# Patient Record
Sex: MALE | Race: OTHER | Employment: OTHER | URBAN - METROPOLITAN AREA
[De-identification: names, ages, dates, MRNs, and addresses within clinical notes are randomized per-mention and may not be internally consistent; named-entity substitution may affect disease eponyms.]

---

## 2018-03-08 ENCOUNTER — OFFICE VISIT (OUTPATIENT)
Dept: FAMILY MEDICINE CLINIC | Age: 33
End: 2018-03-08

## 2018-03-08 VITALS
DIASTOLIC BLOOD PRESSURE: 60 MMHG | TEMPERATURE: 98.1 F | WEIGHT: 121 LBS | HEIGHT: 64 IN | SYSTOLIC BLOOD PRESSURE: 97 MMHG | HEART RATE: 67 BPM | BODY MASS INDEX: 20.66 KG/M2

## 2018-03-08 DIAGNOSIS — R05.9 COUGH IN ADULT: ICD-10-CM

## 2018-03-08 DIAGNOSIS — Z23 ENCOUNTER FOR IMMUNIZATION: ICD-10-CM

## 2018-03-08 DIAGNOSIS — L30.9 ECZEMA, UNSPECIFIED TYPE: ICD-10-CM

## 2018-03-08 DIAGNOSIS — Z13.9 ENCOUNTER FOR SCREENING: Primary | ICD-10-CM

## 2018-03-08 LAB — HGB BLD-MCNC: 15 G/DL

## 2018-03-08 RX ORDER — LORATADINE 10 MG/1
10 TABLET ORAL DAILY
Qty: 30 TAB | Refills: 5 | Status: SHIPPED | OUTPATIENT
Start: 2018-03-08

## 2018-03-08 RX ORDER — GUAIFENESIN 600 MG/1
600 TABLET, EXTENDED RELEASE ORAL 2 TIMES DAILY
Qty: 20 TAB | Refills: 1 | Status: SHIPPED | OUTPATIENT
Start: 2018-03-08

## 2018-03-08 NOTE — PATIENT INSTRUCTIONS
Tos: Instrucciones de cuidado - [ Cough: Care Instructions ]  Instrucciones de cuidado    La tos es [place] de she[place] cuerpo a algo que molesta en la garganta o las vías respiratorias. La tos puede ser provocada por muchas cosas. Usted podría toser debido a un resfriado o mirna gripe, mirna bronquitis o el asma. Fumar, el goteo posnasal, las alergias y el ácido estomacal que regresa a sheffield garganta también pueden causar tos. La tos es un síntoma, no mirna enfermedad. En la [employer], la tos cesa cuando desaparece la causa, jorge un resfriado. Puede megha algunas medidas en sheffield hogar para toser menos y sentirse mejor. La atención de seguimiento es mirna parte clave de sheffield tratamiento y seguridad. Asegúrese de hacer y acudir a todas las citas, y llame a sheffield médico si está teniendo problemas. También es mirna buena idea saber los resultados de los exámenes y mantener mirna lista de los medicamentos que amy. ¿Cómo puede cuidarse en el hogar? · Aury abundante agua y otros líquidos. [place] ayuda a [place] [employer] mucosidad sea menos espesa y [place] la garganta seca o adolorida. La miel o el jugo de orquidea en [place] o té podrían aliviar mirna tos seca. · [employer] medicamentos para la tos según las indicaciones de sheffield médico.  · Eleve la ghazal sobre almohadas para ayudarle a respirar y aliviar la tos seca. · Pruebe pastillas para la tos para aliviar la garganta seca o adolorida. Las pastillas para la tos no detienen la tos. Las pastillas para la tos medicinales saborizadas no son mejores que las pastillas con sabor a shirley o los caramelos duros. · No fume. Evite el humo de tabaco ambiental. Si necesita ayuda para dejar de fumar, hable con sheffield médico sobre programas y medicamentos para dejar de fumar. Estos pueden aumentar la probabilidades de dejar el hábito para siempre. ¿Cuándo debe pedir ayuda? Llame al 911 en cualquier momento que considere que necesita atención de [place]. Por ejemplo, llame si:  ?  · Tiene graves dificultades para respirar. ? Llame a sheffield médico ahora mismo o busque atención médica inmediata si:  ? · Tose grecia. ? · Tiene nuevas dificultades para respirar o Clarence Mom. ? · Tiene fiebre nueva o más norm. ? · Tiene un salpullido nuevo. ?Preste especial atención a los cambios en sheffield shelia y asegúrese de comunicarse con sheffield médico si:  ? · [place] tos es más profunda o más frecuente que antes, especialmente si nota más mucosidad o un cambio en el color de la mucosidad. ? · Tiene nuevos síntomas, jorge dolor de garganta, dolor de oídos o dolor en los senos paranasales. ? · No mejora jorge se esperaba. ¿Dónde puede encontrar más información en inglés? Vaishali Dodge a [email or web address].info/. Escriba D279 en la búsqueda para aprender más acerca de \"Tos: Instrucciones de cuidado - [ Cough: Care Instructions ]. \"  Revisado: 12 baron, 2017  Versión del contenido: 11.4  © 9259-5829 Healthwise, Incorporated. Las instrucciones de cuidado fueron adaptadas bajo licencia por Good Help Connections (which disclaims liability or warranty for this information). Si usted tiene [place] afección médica o sobre estas instrucciones, siempre pregunte a sheffield profesional de shelia. Healthwise, Incorporated niega toda garantía o responsabilidad por sheffield uso de esta información. Dermatitis atópica: Instrucciones de cuidado - [ Atopic Dermatitis: Care Instructions ]  Instrucciones de cuidado  La dermatitis atópica (también llamada eccema) es un problema de la piel que causa mirna comezón intensa y un salpullido Vonne Borer y Anvaing. En los casos graves, el salpullido forma ampollas que contienen un líquido ashley. El salpullido no es contagioso. Las personas con esta afección parecen tener sistemas inmunitarios muy sensibles, que tienen propensión a reaccionar a cosas que causan alergias. El sistema inmunitario es la manera en que el organismo combate las infecciones.   No existe dulce para la dermatitis atópica, sandeep yosef vez pueda controlarla con cuidados en el hogar. La atención de seguimiento es mirna parte clave de sheffield tratamiento y seguridad. Asegúrese de hacer y acudir a todas las citas, y llame a sheffield médico si está teniendo problemas. También es mirna buena idea saber los resultados de los exámenes y mantener mirna lista de los medicamentos que amy. ¿Cómo puede cuidarse en el hogar? · Use un humectante al CHILDREN'S \[facility] [place] [facility]\"" [facility] veces al día. · Si sheffield médico le receta mirna crema, úsela según las indicaciones. Si sheffield médico le receta otros medicamentos, tómelos exactamente según las indicaciones. · Lávese la geovani afectada solo con agua. El jabón puede empeorar la sequedad y la comezón. Seque la geovani con toques suaves de toalla. · Aplíquese un humectante después de bañarse. Utilice cremas jorge Lubriderm, Moisturel o Cetaphil que no irritan la piel ni causan salpullido. Aplíquese la crema mientras todavía tiene la piel húmeda después de secarla ligeramente con mirna toalla. · Use paños fríos y húmedos para reducir la comezón. · Manténgase fresco y evite el sol. · Si la comezón afecta la actividades normales, un antihistamínico de venta [place], jorge difenhidramina (Benadryl) o loratadina (Claritin) podría ayudar. [place] y siga todas las instrucciones de la [place]. ¿Cuándo debe pedir ayuda? Llame a sheffield médico ahora mismo o busque atención médica inmediata si:  ? · El salpullido [place]. ? · Tiene ampollas o moretones nuevos, o el salpullido se extiende y parece [place]. ? · Tiene señales de infección, tales jorge:  ¨ Aumento del dolor, la hinchazón, el enrojecimiento o la temperatura. ¨ Vetas rojizas que salen del salpullido. ¨ Pus que sale del salpullido. Uday Glez. ? · Tiene llagas con costras o que exudan líquido. ? · Tiene alex en las articulaciones o el marie del cuerpo, además del salpullido.    ?Preste especial atención a los cambios en sheffield shelia y asegúrese de comunicarse con sheffield médico si:  ? · El salpullido no desaparece después de 2 a 3 semanas de tratamiento en el hogar. ? · La comezón interfiere en el sueño o las actividades cotidianas. ¿Dónde puede encontrar más información en inglés? Oumou Palacios a http://[email or web address]-james.[email or web address] Kyler Hickey N440 en la búsqueda para aprender más acerca de \"Dermatitis atópica: Instrucciones de cuidado - [ Atopic Dermatitis: Care Instructions ]. \"  Revisado: 13 octubre, 2016  Versión del contenido: 11.4  © 7721-3100 Healthwise, Incorporated. Las instrucciones de cuidado fueron adaptadas bajo licencia por Good Help Connections (which disclaims liability or warranty for this information). Si usted tiene [place] afección médica o sobre estas instrucciones, siempre pregunte a sheffield profesional de shelia. [place], [employer]EEspy niega toda garantía o responsabilidad por sheffield uso de esta información.

## 2018-03-08 NOTE — MR AVS SNAPSHOT
[address] [address] [address] 
[phone] Patient: Shahla Sanchez MRN: [id number] :1988 Visit Information [place] [facility] Faith Personal Médico Departamento Teléfono del Dep. Número de visita 3/8/2018  9:45 AM Quirino Trung [address], DORCAS Mcmanus [phone] Follow-up Instructions Return if symptoms worsen or fail to improve. Upcoming Health Maintenance Date Due DTaP/Tdap/Td series (1 - Tdap) 2007 Influenza Age 5 to Adult 2017 Jessica Graver A partir del:  3/8/2018 No Known Allergies Vacunas actuales Glenny Kennel No hay ninguna vacuna archivada. No revisadas esta visita You Were Diagnosed With   
  
 Sally Garibay Encounter for screening    -  Primary ICD-10-CM: Z13.9 ICD-9-CM: V82.9 Eczema, unspecified type     ICD-10-CM: L30.9 ICD-9-CM: 692.9 Cough in adult     ICD-10-CM: R05 ICD-9-CM: 786.2 Partes vitales PS Pulso Temperatura [place] ( percentil de crecimiento) Peso (percentil de crecimiento) BMI (IMC)  
 97/60 (BP 1 Location: Right arm, BP Patient Position: Sitting) 67 98.1 °F (36.7 °C) (Oral) 5' 3.78\" (1.62 m) 121 lb (54.9 kg) 20.91 kg/m2 Estatus de tabaquísmo Never Smoker Historial de signos vitales BMI and BSA Data Body Mass Index Body Surface Area  
 20.91 kg/m 2 1.57 m 2 [facility] Name Phone [place] [address], [address] [facility] [phone] [place] lista de medicamentos actualizada Lista actualizada 3/8/18 10:50 AM.  Li Cough use pelletier lista de medicamentos más reciente. guaiFENesin  mg ER tablet También conocido jorge:  Yaakov [employer] Yaakov Take 1 Tab by mouth two (2) times a day. [place] 1 [employer] veces al francisco con un vaso de [place] hydrocortisone 0.5 % lotion Use sparingly bid as needed  
  
 loratadine 10 mg tablet También conocido jorge:  Franklin Lush Take 1 Tab by mouth daily. [place] 1 pastilla cada francisco Recetas Enviado a la [place] Refills  
 loratadine (CLARITIN) 10 mg tablet 5 Sig: Take 1 Tab by mouth daily. [place] 1 pastilla cada francisco Class: Normal  
 Pharmacy: [facility] [address], [address] [facility] Ph #: [phone] Route: Oral  
 hydrocortisone 0.5 % lotion 0 Sig: Use sparingly bid as needed Class: Normal  
 Pharmacy: [facility] [address] [facility] [address] AT [facility] Ph #: [phone]  
 guaiFENesin ER (MUCINEX) 600 mg ER tablet 1 Sig: Take 1 Tab by mouth two (2) times a day. [place] 1 [facility] [employer] veces al francisco con un vaso de [place] Class: Normal  
 Pharmacy: [facility] [address], [address] [facility] Ph #: [phone] Route: Oral  
  
Hicimos lo siguiente AMB POC HEMOGLOBIN (HGB) [73264 CPT(R)] Instrucciones de seguimiento Return if symptoms worsen or fail to improve. Instrucciones para el Paciente Tos: Instrucciones de cuidado - [ Cough: Care Instructions ] Instrucciones de cuidado La tos es mirna respuesta de sheffield cuerpo a algo que molesta en la garganta o las vías respiratorias. La tos puede ser provocada por muchas cosas. Usted podría toser debido a un resfriado o mirna gripe, mirna bronquitis o el asma. Fumar, el goteo posnasal, las alergias y el ácido estomacal que regresa a sheffield garganta también pueden causar tos. La tos es un síntoma, no mirna enfermedad. En la [employer], la tos cesa cuando desaparece la causa, jorge un resfriado. Puede megha algunas medidas en sheffield hogar para toser menos y sentirse mejor. La atención de seguimiento es mirna parte clave de sheffield tratamiento y seguridad.  Asegúrese de hacer y acudir a todas las citas, y llame a sheffield médico si está teniendo problemas. También es mirna buena idea saber los resultados de los exámenes y mantener mirna lista de los medicamentos que amy. Cómo puede cuidarse en el hogar? · Aury abundante agua y otros líquidos. [place] ayuda a [place] [employer] mucosidad sea menos espesa y [place] la garganta seca o adolorida. La miel o el jugo de orquidea en [place] o té podrían aliviar mirna tos seca. · [employer] medicamentos para la tos según las indicaciones de pelletier médico. 
· Eleve la ghazal sobre almohadas para ayudarle a respirar y aliviar la tos seca. · Pruebe pastillas para la tos para aliviar la garganta seca o adolorida. Las pastillas para la tos no detienen la tos. Las pastillas para la tos medicinales saborizadas no son mejores que las pastillas con sabor a shirley o los caramelos duros. · No fume. Evite el humo de tabaco ambiental. Si necesita ayuda para dejar de fumar, hable con pelletier médico sobre programas y medicamentos para dejar de fumar. Estos pueden aumentar la probabilidades de dejar el hábito para siempre. Cuándo debe pedir ayuda? Llame al 911 en cualquier momento que considere que necesita atención de [place]. Por ejemplo, llame si: 
? · Tiene graves dificultades para respirar. ? Llame a pelletier médico ahora mismo o busque atención médica inmediata si: 
? · Tose grecia. ? · Tiene nuevas dificultades para respirar o Alli Nakai. ? · Tiene fiebre nueva o más norm. ? · Tiene un salpullido nuevo. ?Preste especial atención a los cambios en pelletier shelia y asegúrese de comunicarse con pelletier médico si: 
? · [place] tos es más profunda o más frecuente que antes, especialmente si nota más mucosidad o un cambio en el color de la mucosidad. ? · Tiene nuevos síntomas, jorge dolor de garganta, dolor de oídos o dolor en los senos paranasales. ? · No mejora jorge se esperaba. Dónde puede encontrar más información en inglés? Beryl Begin a [email or web address] Escriba D279 en la búsqueda para aprender más acerca de \"Tos: Instrucciones de cuidado - [ Cough: Care Instructions ]. \" 
Revisado: 12 baron, 2017 Versión del contenido: 11.4 © 6146-1186 Healthwise, Incorporated. Las instrucciones de cuidado fueron adaptadas bajo licencia por Good Help Connections (which disclaims liability or warranty for this information). Si usted tiene [place] afección médica o sobre estas instrucciones, siempre pregunte a sheffield profesional de shelia. Healthwise, Incorporated niega toda garantía o responsabilidad por sheffield uso de esta información. Dermatitis atópica: Instrucciones de cuidado - [ Atopic Dermatitis: Care Instructions ] Instrucciones de cuidado La dermatitis atópica (también llamada eccema) es un problema de la piel que causa mirna comezón intensa y un salpullido rojizo y [place]va[place]. En los casos graves, el salpullido forma ampollas que contienen un líquido ashley. El salpullido no es contagioso. Las personas con esta afección parecen tener sistemas inmunitarios muy sensibles, que tienen propensión a reaccionar a cosas que causan alergias. El sistema inmunitario es la manera en que el organismo combate las infecciones. No existe dulce para la dermatitis atópica, sandeep yosef vez pueda controlarla con cuidados en el hogar. La atención de seguimiento es mirna parte clave de sheffield tratamiento y seguridad. Asegúrese de hacer y acudir a todas las citas, y llame a sheffield médico si está teniendo problemas. También es mirna buena idea saber los resultados de los exámenes y mantener mirna lista de los medicamentos que amy. Cómo puede cuidarse en el hogar? · Use un humectante al [facility] veces al día. · Si sheffield médico le receta mirna crema, úsela según las indicaciones. Si sheffield médico le receta otros medicamentos, tómelos exactamente según las indicaciones. · Lávese la geovani afectada solo con agua. El jabón puede empeorar la sequedad y la comezón. Seque la geovani con toques suaves de toalla. · Aplíquese un humectante después de bañarse. Utilice cremas jorge Lubriderm, Moisturel o Cetaphil que no irritan la piel ni causan salpullido. Aplíquese la crema mientras todavía tiene la piel húmeda después de secarla ligeramente con mirna toalla. · Use paños fríos y húmedos para reducir la comezón. · Manténgase fresco y evite el sol. · Si la comezón afecta la actividades normales, un antihistamínico de venta [place], jorge difenhidramina (Benadryl) o loratadina (Claritin) podría ayudar. [place] y siga todas las instrucciones de la [place]. Cuándo debe pedir ayuda? Llame a sheffield médico ahora mismo o busque atención médica inmediata si: 
? · El salpullido [place] y [place]. ? · Tiene ampollas o moretones nuevos, o el salpullido se extiende y parece [place]. ? · [address], tales jorge: ¨ Aumento del dolor, la hinchazón, el enrojecimiento o la temperatura. ¨ Vetas rojizas que salen del salpullido. ¨ Pus que sale del salpullido. Hussein Spillers. ? · Tiene llagas con costras o que exudan líquido. ? · Tiene alex en las articulaciones o el marie del cuerpo, además del salpullido. ?Preste especial atención a los cambios en sheffield shelia y asegúrese de comunicarse con sheffield médico si: 
? · El salpullido no desaparece después de 2 a 3 semanas de tratamiento en el hogar. ? · La comezón interfiere en el sueño o las actividades cotidianas. Dónde puede encontrar más información en inglés? Immanuel Tomas a [email or web address]-james.[email or web address] Ashley Kinsey O353 en la búsqueda para aprender más acerca de \"Dermatitis atópica: Instrucciones de cuidado - [ Atopic Dermatitis: Care Instructions ]. \" 
Revisado: 13 octubre, 2016 Versión del contenido: 11.4 © 8968-6256 Healthwise, [employer]. Las instrucciones de cuidado fueron adaptadas bajo licencia por Good Help Connections (which disclaims liability or warranty for this information).  Si usted tiene preguntas sobre mirna afección médica o sobre estas instrucciones, siempre pregunte a sheffield profesional de shelia. [facility], Incorporated niega toda garantía o responsabilidad por sheffield uso de esta información. Introducing [facility]! [facility] introduce portal paciente MyChart . Ahora se puede acceder a partes de sheffield expediente médico, enviar por correo electrónico la oficina de sheffield médico y solicitar renovaciones de medicamentos en línea. En sheffield navegador de Internet , Chika Nicholson a [email or web address] Mona clic en el usuario por Teresa Jaimes? Juliann Pass clic aquí en la sesión [place]. Verá la página de registro [place]. Ingrese sheffield código de [employer] yosef y jorge aparece a continuación. Usted no tendrá que [employer] código después de sara completado el proceso de registro . Si usted no se inscribe antes de la fecha de caducidad , debe solicitar un nuevo código. · MyChart Código de acceso : [id number] Expires: 6/6/2018 10:50 AM 
 
Ingresa los últimos cuatro dígitos de sheffield Número de Seguro Social ( xxxx ) y fecha de nacimiento ( dd / mm / aaaa ) jorge se indica y mona clic en Enviar. Usted será llevado a la siguiente página de registro . Crear un ID MyChart . Esta será sheffield ID de inicio de sesión de MyChart y no puede ser [place] , por lo que pensar en mirna que es [place] y fácil de recordar . Crear mirna contraseña MyChart . Usted puede cambiar sheffield contraseña en cualquier momento . Ingrese sheffield Password Reset de preguntas y [place] . [place]ota se puede utilizar en un momento posterior si usted olvida sheffield contraseña. Introduzca sheffield dirección de correo electrónico . Nori Moran recibirá mirna notificación por correo electrónico cuando la nueva información está disponible en MyChart . Barbar Elm clic en Registrarse. Keary Kos teressa y descargar porciones de sheffield expediente médico. 
Mona clic en el enlace de descarga del menú Resumen para descargar mirna copia portátil de sheffield información médica . Si tiene [employer] , por favor visite la sección de preguntas frecuentes del sitio web MyChart . Recuerde, MyChart NO es que se utilizará para las necesidades urgentes. Para emergencias médicas , llame al 911 . Ahora disponible en sheffield iPhone y Android ! Por favor proporcione anant resumen de la documentación de cuidado a sheffield próximo proveedor. If you have any questions after today's visit, please call [phone].

## 2018-03-08 NOTE — PROGRESS NOTES
At discharge station AVS was printed and reviewed with pt with [facility] as . Gave pt Good RX card. Coupons not available for his RX. Reviewed rx script for  Told pt rx should be ready for  at pharmacy in approximately 2 hrs. Gave vaccine per protocol. [facility] [facility]. Gave patient VIS information sheet and reviewed instructions regarding possible adverse side effects and allergic reactions. No adverse reaction noted at time of discharge.  Kimberly Smith RN

## 2018-03-08 NOTE — PROGRESS NOTES
Assessment/Plan:    Diagnoses and all orders for this visit:    1. Encounter for screening  -     AMB POC HEMOGLOBIN (HGB)    2. Eczema, unspecified type  -     hydrocortisone 0.5 % lotion; Use sparingly bid as needed    3. Cough in adult  -     loratadine (CLARITIN) 10 mg tablet; Take 1 Tab by mouth daily. [place] 1 pastilla cada francisco  -     guaiFENesin ER (MUCINEX) 600 mg ER tablet; Take 1 Tab by mouth two (2) times a day. [place] 1 [place] veces al francisco con un vaso de agua        Follow-up Disposition:  Return if symptoms worsen or fail to improve. Juliann Crandall PA-C  [facility] expressed understanding of this plan. An AVS was printed and given to the patient.      ----------------------------------------------------------------------    Chief Complaint   Patient presents with    Sneezing     pt states coughing up white phlem and tired very easily     Fever     with a rash       History of Present Illness:  New pt, 4 days of cough with white phlegm and feels fatigued easily. He is a non smoker, no chronic medical problems. No n/v/d. Appetite is down. Wife has same sxs. Daughter had a cough for 2 days then it resolved. The cough is not keeping him up at night, he is sleeping well. He has felt \"feverish\" but has not checked his temp  He has had a pruritic rash on his face since he arrived in the [address] [place] [address] [address] about 1 year ago. He used a cream that was rx'd to him about a year ago that helped  His job is outdoors working with electricity. He thinks that the change in climate from [place] has really made his skin problem worse       No past medical history on file. Current Outpatient Prescriptions   Medication Sig Dispense Refill    loratadine (CLARITIN) 10 mg tablet Take 1 Tab by mouth daily. [place] 1 pastilla cada francisco 30 Tab 5    hydrocortisone 0.5 % lotion Use sparingly bid as needed 57 Bottle 0    guaiFENesin ER (MUCINEX) 600 mg ER tablet Take 1 Tab by mouth two (2) times a day.  [facility] al francisco con un vaso de agua 20 Tab 1       Allergies no known allergies    Social History   Substance Use Topics    Smoking status: Never Smoker    Smokeless tobacco: Never Used    Alcohol use No       No family history on file.     Physical Exam:     Visit Vitals    BP 97/60 (BP 1 Location: Right arm, BP Patient Position: Sitting)    Pulse 67    Temp 98.1 °F (36.7 °C) (Oral)    Ht 5' 3.78\" (1.62 m)    Wt 121 lb (54.9 kg)    BMI 20.91 kg/m2   looks well, pleasant, I saw his wife today too and she had clear lungs   No coughing during exam time     A&Ox3  WDWN NAD  Respirations normal and non labored  HEENT- arpit clear fluid bulge no injection TM's  Nares patent  OP red posteriorly, no tonsil exudate or swelling, glistening   Lungs are cTA bi  Skin- on his face he has flat red lacy rash especially across mid face/ cheek bones

## 2018-03-08 NOTE — PROGRESS NOTES
Results for orders placed or performed in visit on 03/08/18   AMB POC HEMOGLOBIN (HGB)   Result Value Ref Range    Hemoglobin (POC) 15.0

## 2018-11-07 ENCOUNTER — HOSPITAL ENCOUNTER (EMERGENCY)
Age: 33
Discharge: HOME | End: 2018-11-07
Attending: STUDENT IN AN ORGANIZED HEALTH CARE EDUCATION/TRAINING PROGRAM
Payer: SELF-PAY

## 2018-11-07 ENCOUNTER — APPOINTMENT (OUTPATIENT)
Dept: GENERAL RADIOLOGY | Age: 33
End: 2018-11-07
Attending: STUDENT IN AN ORGANIZED HEALTH CARE EDUCATION/TRAINING PROGRAM
Payer: SELF-PAY

## 2018-11-07 VITALS
TEMPERATURE: 98.6 F | SYSTOLIC BLOOD PRESSURE: 106 MMHG | HEART RATE: 70 BPM | DIASTOLIC BLOOD PRESSURE: 80 MMHG | RESPIRATION RATE: 14 BRPM | HEIGHT: 60 IN | WEIGHT: 120 LBS | BODY MASS INDEX: 23.56 KG/M2 | OXYGEN SATURATION: 100 %

## 2018-11-07 DIAGNOSIS — Z23 NEED FOR TETANUS BOOSTER: ICD-10-CM

## 2018-11-07 DIAGNOSIS — S62.650B OPEN NONDISPLACED FRACTURE OF MIDDLE PHALANX OF RIGHT INDEX FINGER, INITIAL ENCOUNTER: Primary | ICD-10-CM

## 2018-11-07 DIAGNOSIS — S61.411A LACERATION OF RIGHT HAND WITHOUT FOREIGN BODY, INITIAL ENCOUNTER: ICD-10-CM

## 2018-11-07 DIAGNOSIS — S61.210A LACERATION OF RIGHT INDEX FINGER WITH TENDON INVOLVEMENT, INITIAL ENCOUNTER: ICD-10-CM

## 2018-11-07 PROCEDURE — 74011250637 HC RX REV CODE- 250/637: Performed by: PHYSICIAN ASSISTANT

## 2018-11-07 PROCEDURE — 73140 X-RAY EXAM OF FINGER(S): CPT

## 2018-11-07 PROCEDURE — 96365 THER/PROPH/DIAG IV INF INIT: CPT

## 2018-11-07 PROCEDURE — 75810000294 HC INTERM/LAYERED WND RPR

## 2018-11-07 PROCEDURE — 74011000250 HC RX REV CODE- 250: Performed by: PHYSICIAN ASSISTANT

## 2018-11-07 PROCEDURE — 90715 TDAP VACCINE 7 YRS/> IM: CPT | Performed by: STUDENT IN AN ORGANIZED HEALTH CARE EDUCATION/TRAINING PROGRAM

## 2018-11-07 PROCEDURE — 77030018836 HC SOL IRR NACL ICUM -A

## 2018-11-07 PROCEDURE — 99284 EMERGENCY DEPT VISIT MOD MDM: CPT

## 2018-11-07 PROCEDURE — 90471 IMMUNIZATION ADMIN: CPT

## 2018-11-07 PROCEDURE — 74011250637 HC RX REV CODE- 250/637: Performed by: STUDENT IN AN ORGANIZED HEALTH CARE EDUCATION/TRAINING PROGRAM

## 2018-11-07 PROCEDURE — 74011250636 HC RX REV CODE- 250/636: Performed by: STUDENT IN AN ORGANIZED HEALTH CARE EDUCATION/TRAINING PROGRAM

## 2018-11-07 PROCEDURE — 74011000258 HC RX REV CODE- 258: Performed by: STUDENT IN AN ORGANIZED HEALTH CARE EDUCATION/TRAINING PROGRAM

## 2018-11-07 PROCEDURE — 77030031132 HC SUT NYL COVD -A

## 2018-11-07 RX ORDER — CEPHALEXIN 500 MG/1
500 CAPSULE ORAL 3 TIMES DAILY
Qty: 21 CAP | Refills: 0 | Status: SHIPPED | OUTPATIENT
Start: 2018-11-07 | End: 2018-11-07

## 2018-11-07 RX ORDER — LIDOCAINE HYDROCHLORIDE 10 MG/ML
5 INJECTION, SOLUTION EPIDURAL; INFILTRATION; INTRACAUDAL; PERINEURAL ONCE
Status: COMPLETED | OUTPATIENT
Start: 2018-11-07 | End: 2018-11-07

## 2018-11-07 RX ORDER — HYDROCODONE BITARTRATE AND ACETAMINOPHEN 5; 325 MG/1; MG/1
1 TABLET ORAL
Qty: 8 TAB | Refills: 0 | Status: ON HOLD | OUTPATIENT
Start: 2018-11-07 | End: 2018-11-14 | Stop reason: SDUPTHER

## 2018-11-07 RX ORDER — OXYCODONE AND ACETAMINOPHEN 5; 325 MG/1; MG/1
1 TABLET ORAL
Status: COMPLETED | OUTPATIENT
Start: 2018-11-07 | End: 2018-11-07

## 2018-11-07 RX ORDER — LIDOCAINE HYDROCHLORIDE 10 MG/ML
5 INJECTION INFILTRATION; PERINEURAL ONCE
Status: DISCONTINUED | OUTPATIENT
Start: 2018-11-07 | End: 2018-11-07 | Stop reason: SDUPTHER

## 2018-11-07 RX ORDER — CEPHALEXIN 500 MG/1
500 CAPSULE ORAL 3 TIMES DAILY
Qty: 21 CAP | Refills: 0 | Status: SHIPPED | OUTPATIENT
Start: 2018-11-07 | End: 2018-11-14

## 2018-11-07 RX ORDER — BUPIVACAINE HYDROCHLORIDE 5 MG/ML
3 INJECTION, SOLUTION EPIDURAL; INTRACAUDAL ONCE
Status: COMPLETED | OUTPATIENT
Start: 2018-11-07 | End: 2018-11-07

## 2018-11-07 RX ADMIN — BACITRACIN ZINC, NEOMYCIN SULFATE, POLYMYXIN B SULFATE 1 PACKET: 3.5; 5000; 4 OINTMENT TOPICAL at 21:10

## 2018-11-07 RX ADMIN — OXYCODONE HYDROCHLORIDE AND ACETAMINOPHEN 1 TABLET: 5; 325 TABLET ORAL at 19:05

## 2018-11-07 RX ADMIN — TETANUS TOXOID, REDUCED DIPHTHERIA TOXOID AND ACELLULAR PERTUSSIS VACCINE, ADSORBED 0.5 ML: 5; 2.5; 8; 8; 2.5 SUSPENSION INTRAMUSCULAR at 19:04

## 2018-11-07 RX ADMIN — CEFAZOLIN 1 G: 1 INJECTION, POWDER, FOR SOLUTION INTRAMUSCULAR; INTRAVENOUS at 19:04

## 2018-11-07 RX ADMIN — LIDOCAINE HYDROCHLORIDE 5 ML: 10 INJECTION, SOLUTION EPIDURAL; INFILTRATION; INTRACAUDAL; PERINEURAL at 19:05

## 2018-11-07 RX ADMIN — BUPIVACAINE HYDROCHLORIDE 15 MG: 5 INJECTION, SOLUTION EPIDURAL; INTRACAUDAL; PERINEURAL at 19:05

## 2018-11-07 NOTE — ED PROVIDER NOTES
26 y.o. male with no significant past medical history who presents to the ED with chief complaint of laceration. Pt reports he was working on a running vehicle today when the \"belt\" started spinning (the car was on at the time) and cut into his right 2nd finger. Pt now reports a laceration to his right 2nd finger with associated pain and lacerations to the back of his hand. Pt states he is right hand dominant. Pt states his tetanus is not up to date. There are no other acute medical complaints voiced at this time. Further denies numbness/weakness in his hand. Social Hx: Never smoker. Denies EtOH use. PCP: No primary care provider on file. Note written by Glenn Arguelles, as dictated by DORCAS Rodriges 5:59 PM  
 
 
The history is provided by the patient and a relative. The history is limited by a language barrier (Kenyan). A  was used (relative). History reviewed. No pertinent past medical history. History reviewed. No pertinent surgical history. History reviewed. No pertinent family history. Social History Socioeconomic History  Marital status:  Spouse name: Not on file  Number of children: Not on file  Years of education: Not on file  Highest education level: Not on file Social Needs  Financial resource strain: Not on file  Food insecurity - worry: Not on file  Food insecurity - inability: Not on file  Transportation needs - medical: Not on file  Transportation needs - non-medical: Not on file Occupational History  Not on file Tobacco Use  Smoking status: Never Smoker  Smokeless tobacco: Never Used Substance and Sexual Activity  Alcohol use: No  
 Drug use: Not on file  Sexual activity: Not on file Other Topics Concern  Not on file Social History Narrative  Not on file ALLERGIES: Patient has no known allergies. Review of Systems Constitutional: Negative for chills and fever. Respiratory: Negative for cough and shortness of breath. Cardiovascular: Negative for chest pain and palpitations. Gastrointestinal: Negative for diarrhea and vomiting. Skin: Positive for wound (laceration right 2nd finger with associated pain). Neurological: Negative for syncope and headaches. All other systems reviewed and are negative. Vitals:  
 11/07/18 1747 11/07/18 1900 11/07/18 1930 11/07/18 2100 BP: 125/74 130/85 112/71 106/80 Pulse: 78   70 Resp: 14   14 Temp: 99 °F (37.2 °C)   98.6 °F (37 °C) SpO2: 99%   100% Weight: 54.4 kg (120 lb) Height: 5' (1.524 m) Physical Exam  
Constitutional: He is oriented to person, place, and time. He appears well-developed and well-nourished. He is active. Non-toxic appearance. No distress. HENT:  
Head: Normocephalic and atraumatic. Eyes: Conjunctivae are normal. Pupils are equal, round, and reactive to light. Right eye exhibits no discharge. Left eye exhibits no discharge. Neck: Normal range of motion and full passive range of motion without pain. No tracheal tenderness present. Cardiovascular: Normal rate, regular rhythm, normal heart sounds, intact distal pulses and normal pulses. Exam reveals no gallop and no friction rub. No murmur heard. Pulmonary/Chest: Effort normal and breath sounds normal. No respiratory distress. He has no wheezes. He has no rales. He exhibits no tenderness. Abdominal: Soft. Bowel sounds are normal. He exhibits no distension. There is no tenderness. There is no rebound and no guarding. Musculoskeletal: Normal range of motion. He exhibits no edema or tenderness. Neurological: He is alert and oriented to person, place, and time. He has normal strength. No cranial nerve deficit or sensory deficit. Coordination normal.  
Skin: Skin is warm, dry and intact.  Capillary refill takes less than 2 seconds. No abrasion and no rash noted. He is not diaphoretic. No erythema. R index finger- Open angulated wound to dorsal aspect middle phalanx with visible extensor tendon laceration. Small black specs (FB) within the wound, removed prior to closure. Capp refill brisk. States normal sensation. Unable to extend digit from laceration distally. 2 lacerations to the base of dorsal digist 3, 4 on right hand Psychiatric: He has a normal mood and affect. His speech is normal and behavior is normal. Cognition and memory are normal.  
Nursing note and vitals reviewed. MDM Number of Diagnoses or Management Options Laceration of right hand without foreign body, initial encounter:  
Laceration of right index finger with tendon involvement, initial encounter:  
Need for tetanus booster:  
Open nondisplaced fracture of middle phalanx of right index finger, initial encounter:  
Diagnosis management comments:  
Ddx: frx, open frx, FB, tendon lac Amount and/or Complexity of Data Reviewed Tests in the radiology section of CPT®: ordered and reviewed Review and summarize past medical records: yes Discuss the patient with other providers: yes Patient Progress Patient progress: stable Procedures The patient was seen by the supervising physician who was the provider in triage. I discussed patient's PMH, exam findings as well as careplan with the attending who agrees with care plan. Dariusz Jhaveri PA-C 
 
 
PROGRESS NOTE: 
8:23 PM 
I spoke to Leonor Allen, DAVID, and Dr. Chelle Us from [facility] about the patient. I made them aware I was concerned about patient's injury, that I am concerned he may lose his finger due to his injury. He states normal sensation prior to the block and has a normal capp refill. Ortho advised following up with Dr. Tiara Garrido (orhto hand) tomorrow morning; also advised IV abx now, discharge on oral abx, irrigate wound, loose approximate it now. Procedure Note - Digital Block:  
7:30 PM 
Performed by: Arnoldo Herrera PA-C Lidocaine 1% without epinephrine and 0.5% bupivacaine 3mL/2mL respectively used to perform digital block of Right index finger(s). The procedure took 1-15 minutes, and pt tolerated well. Procedure Note - Laceration Repair: 
8:00 PM 
Procedure by Arnoldo Herrera PA-C. Complexity: complex 4cm curved laceration to R index middle phalanx  was irrigated copiously with NS under jet lavage, prepped with Betadine and draped in a sterile fashion. The area was anesthetized with digital block (see above). The wound was explored with the following results: 2 small black foreign bodies found and removed, irrigated after removal, complete extensor tendon laceration seen. The wound was repaired with One layer suture closure: Skin Layer:  7 sutures placed, stitch type:simple interrupted, suture: 4-0 nylon. .  The wound was closed with good hemostasis and loose approximation. Sterile dressing applied. Estimated blood loss: 1mL The procedure took 16-30 minutes, and pt tolerated well. Procedure Note - Laceration Repair: 
8:29 PM 
Procedure by Arnoldo Herrera PA-C Complexity: simple 1cm linear laceration to base of dorsal 4th digit  was irrigated copiously with NS under jet lavage, prepped with Betadine and draped in a sterile fashion. The area was anesthetized with 1 mLs of  Lidocaine 1% without epinephrine via local infiltration. The wound was explored with the following results: No foreign bodies found, No tendon laceration seen. The wound was repaired with One layer suture closure: Skin Layer:  2 sutures placed, stitch type:simple interrupted, suture: 5-0 nylon. .  The wound was closed with good hemostasis and approximation. Sterile dressing applied. Estimated blood loss: <1mL The procedure took 1-15 minutes, and pt tolerated well. Procedure Note - Laceration Repair: 
8:34 PM 
Procedure by Arnoldo Herrera PA-C. Complexity: simple 0.5cm linear laceration to dorsum of 3rd knuckle  was irrigated copiously with NS under jet lavage, prepped with Betadine and draped in a sterile fashion. The area was anesthetized with 1 mLs of  Lidocaine 1% without epinephrine via local infiltration. The wound was explored with the following results: No foreign bodies found, No tendon laceration seen. The wound was repaired with One layer suture closure: Skin Layer:  2 sutures placed, stitch type:simple interrupted, suture: 5-0 nylon. .  The wound was closed with good hemostasis and approximation. Sterile dressing applied. Estimated blood loss: <1mL The procedure took 1-15 minutes, and pt tolerated well. MEDICATIONS GIVEN: 
Medications diph,Pertuss(AC),Tet Vac-PF (BOOSTRIX) suspension 0.5 mL (0.5 mL IntraMUSCular Given 11/7/18 1904) ceFAZolin (ANCEF) 1 g in 0.9% sodium chloride (MBP/ADV) 50 mL (0 g IntraVENous IV Completed 11/7/18 1934) oxyCODONE-acetaminophen (PERCOCET) 5-325 mg per tablet 1 Tab (1 Tab Oral Given 11/7/18 1905) bupivacaine (PF) (MARCAINE) 0.5 % (5 mg/mL) injection 15 mg (15 mg SubCUTAneous Given 11/7/18 1905) lidocaine (PF) (XYLOCAINE) 10 mg/mL (1 %) injection 5 mL (5 mL IntraDERMal Given 11/7/18 1905)  
neomycin-bacitracnZn-polymyxnB (NEOSPORIN) ointment 1 Packet (1 Packet Topical Given 11/7/18 2110) DISCHARGE NOTE: 
8:53 PM 
The patient's results have been reviewed with them and/or available family. Patient and/or family verbally conveyed their understanding and agreement of the patient's signs, symptoms, diagnosis, treatment and prognosis and additionally agree to follow up as recommended in the discharge instructions or to return to the Emergency Room should their condition change prior to their follow-up appointment. The patient/family verbally agrees with the care-plan and verbally conveys that all of their questions have been answered.  The discharge instructions have also been provided to the patient and/or family with some educational information regarding the patient's diagnosis as well a list of reasons why the patient would want to return to the ER prior to their follow-up appointment, should their condition change. Plan: 
1. F/U with ortho hand tomorrow without fail 2. Rx keflex, norco 
3. Splint care / extremity elevation discussed Return precautions discussed and advised to return to ER if worse

## 2018-11-08 NOTE — DISCHARGE INSTRUCTIONS
Follow-up with the orthopedic hand specialist tomorrow. The other sutures need to be taken out in 7 days by a healthcare provider. Fractura de dedo: Instrucciones de cuidado - [ Finger Fracture: Care Instructions ]  Instrucciones de cuidado    Las fracturas de los huesos del dedo suelen sanar william en cuestión de unas 3 a 4 semanas. El dolor y la hinchazón de un dedo fracturado pueden durar semanas. Ronald debería mejorar de forma hafsa después de algunos días tras la fractura. Es muy importante que utilice y cuide el yeso o la tablilla (férula) exactamente jorge sheffield médico le indique para que sheffield dedo sane de forma [place] y no termine torcido. Usar mirna tablilla podría interferir con la actividades normales. Pida ayuda con la actividades diarias si lo necesita. Usted sanará mejor si cuida william de sí mismo. Coma mirna variedad de alimentos saludables y no fume. La atención de seguimiento es mirna parte clave de sheffield tratamiento y seguridad. Asegúrese de hacer y acudir a todas las citas, y llame a sheffield médico si está teniendo problemas. También es mirna buena idea saber los resultados de la exámenes y mantener mirna lista de los medicamentos que amy. ¿Cómo puede cuidarse en el hogar? · Si el médico le puso mirna tablilla en el dedo, úsela exactamente según las indicaciones. No se la quite [employer] indique que puede [place]. · Mantenga la mano elevada por encima del nivel de sheffield corazón tanto jorge pueda. [place] ayudará a reducir la hinchazón. · Colóquese hielo o mirna compresa fría en el dedo srini 10 a 20 minutos cada vez. Trate de hacerlo cada 1 a 2 horas srini los siguientes 3 días (cuando esté despierto) o hasta que la hinchazón baje. Póngase un paño rodriguez entre el hielo y la piel. Mantenga la tablilla seca. · Sea cee con los medicamentos. [place] los analgésicos (medicamentos para el dolor) exactamente según las indicaciones.   ? Si el médico le recetó un analgésico, tómelo según las indicaciones. ? Si no está tomando un analgésico recetado, pregúntele a sheffield médico si puede megha yahaira de [employer]. ¿Cuándo debe pedir ayuda? Llame al 911 en cualquier momento que considere que necesita atención de [place]. Por ejemplo, llame si:    · El dedo está frío o pálido o cambia de color.    Llame a sheffield médico ahora mismo o busque atención médica inmediata si:    · Sheffield dolor empeora mucho.     · Tiene hormigueo, debilitamiento o entumecimiento en el dedo.     · Tiene señales de infección, tales jorge:  ? Aumento del dolor, la hinchazón, el enrojecimiento o la temperatura. ? Vetas rojizas que salen de la geovani. ? Pus que supura de la geovani. ? Ganglios linfáticos inflamados en el jemal, las axilas o la mike. ? Donnise Nela especial atención a los cambios en sheffield shelia y asegúrese de comunicarse con sheffield médico si:    · El dedo no mejora de [place] pereyra. ¿Dónde puede encontrar más información en inglés? Paralee Modest a http://emerita-james.info/. Emili Odonnell N007 en la búsqueda para aprender más acerca de \"Fractura de dedo: Instrucciones de cuidado - [ Finger Fracture: Care Instructions ]. \"  Revisado: 29 noviembre, 2017  Versión del contenido: 11.8  © 8819-8293 [employer]wise, Incorporated. Las instrucciones de cuidado fueron adaptadas bajo licencia por [employer] (which disclaims liability or warranty for this information). Si usted tiene [place] afección médica o sobre estas instrucciones, siempre pregunte a sheffield profesional de shelia. Healthwise, Incorporated niega toda garantía o responsabilidad por sheffield uso de esta información.

## 2018-11-08 NOTE — ED NOTES
Pt arrived to the ED AAOX4, with a c/c of laceration to the right index finger onset 16:30. Pt states he was changing the oil in his car and accidentally his right index finger \"got pulled\" into the the motor of the car. Pt verbalizes no other complaint at this time. Pt is now in ED room with family at bedside, side rail up, bed to lowest position and call light within reach. VS in stable condition, will continue to monitor.

## 2018-11-14 ENCOUNTER — HOSPITAL ENCOUNTER (OUTPATIENT)
Age: 33
Setting detail: OUTPATIENT SURGERY
Discharge: HOME | End: 2018-11-14
Attending: ORTHOPAEDIC SURGERY | Admitting: ORTHOPAEDIC SURGERY
Payer: SELF-PAY

## 2018-11-14 VITALS
DIASTOLIC BLOOD PRESSURE: 70 MMHG | WEIGHT: 119.93 LBS | HEIGHT: 60 IN | HEART RATE: 65 BPM | RESPIRATION RATE: 20 BRPM | SYSTOLIC BLOOD PRESSURE: 114 MMHG | BODY MASS INDEX: 23.55 KG/M2 | TEMPERATURE: 98.3 F | OXYGEN SATURATION: 99 %

## 2018-11-14 DIAGNOSIS — S62.650B OPEN NONDISPLACED FRACTURE OF MIDDLE PHALANX OF RIGHT INDEX FINGER, INITIAL ENCOUNTER: ICD-10-CM

## 2018-11-14 PROCEDURE — 76210000046 HC AMBSU PH II REC FIRST 0.5 HR: Performed by: ORTHOPAEDIC SURGERY

## 2018-11-14 PROCEDURE — 74011250636 HC RX REV CODE- 250/636: Performed by: ORTHOPAEDIC SURGERY

## 2018-11-14 PROCEDURE — 74011000250 HC RX REV CODE- 250: Performed by: ORTHOPAEDIC SURGERY

## 2018-11-14 PROCEDURE — 77030010936 HC CLP LIG BSC -C: Performed by: ORTHOPAEDIC SURGERY

## 2018-11-14 PROCEDURE — 77030020743 HC CAP PROTCT PIN BATR -A: Performed by: ORTHOPAEDIC SURGERY

## 2018-11-14 PROCEDURE — 76030000000 HC AMB SURG OR TIME 0.5 TO 1: Performed by: ORTHOPAEDIC SURGERY

## 2018-11-14 RX ORDER — HYDROCODONE BITARTRATE AND ACETAMINOPHEN 5; 325 MG/1; MG/1
1 TABLET ORAL
Qty: 15 TAB | Refills: 0 | Status: SHIPPED | OUTPATIENT
Start: 2018-11-14

## 2018-11-14 NOTE — DISCHARGE INSTRUCTIONS
Dr. Infante Cleaves Upper Extremity Postoperative Instructions      1. Please maintain the dressing and /or splint placed at surgery until your follow up appointment where it will be removed. Please keep the dressing clean and dry. If you have any questions or problems, please call our office at [phone]. 2. Please elevate the operative extremity to the level of the heart to keep swelling at a minimum. You may get up to move around, but when seated please keep the extremity elevated as much as possible. This will decrease swelling and pain. 3. You were told to be non-weight bearing following surgery. Please do not lift anything heavier than 1 lb with your operative hand. 4. You may ice your Arm/Hand 5 times a day for 20 minutes at a time. 5. You had a nerve block during surgery. Expect this to wear off around 8-12 hours after you received it. You should start taking your pain medication before the feeling begins to come back into your arm/ hand. 6. A prescription for pain medication is provided. The key to pain control is staying ahead of the pain. For the first 48-72 hours after your surgery, you may want to take your pain medication on a regular schedule. After that, you may only need it on an as needed basis. 7. If you experience any redness, increased pain, increased swelling not relieved by elevation, drainage, fever, or chills, please call the office at [phone], and speak with Cristian Daugherty or Meka Lopez. Please Follow-up at my office [address] [facility], [address] in 2 weeks unless otherwise directed. 1. [address] [place] y / o la férula colocados en la cirugía hasta sheffield chapincito de seguimiento donde se retirará. Por favor, mantenga el apósito limpio y seco. Si tiene alguna pregunta o problema, llame a nuestra oficina al [phone]. 2. Tamela Brought la extremidad operativa al nivel del corazón para mantener la hinchazón al mínimo.  Puede levantarse para moverse, sandeep cuando esté sentado, mantenga la extremidad elevada lo más posible. [place] disminuirá la hinchazón y el dolor. 3. Le dijeron que no soportara peso después de la [place]. Por favor, no levante nada que pese más de 1 kristin con sheffield mano operativa. 4. Puede hacer hielo en sheffield brazo / mano 5 veces al día srini 20 minutos a la vez. 5. Tuviste un bloqueo nervioso srini la cirugía. Espera que esto desaparezca alrededor de 8-12 horas después de que lo hayas recibido. Debe comenzar a megha sheffield medicamento para el dolor antes de que la sensación comience a regresar a sheffield [place] / Thomas Blow. 6. Se proporciona mirna receta para medicamentos para el dolor. La clave para controlar el dolor es mantenerse por mar del dolor. Srini las primeras 50 a 72 horas después de sheffield [place], es posible que desee megha sheffield medicamento para el dolor en un horario regular. Después de eso, es posible que solo lo necesite según sea necesario. 7. Si experimenta enrojecimiento, aumento del dolor, aumento de la hinchazón que no se kim con la elevación, el drenaje, la fiebre o los Viviana Lion a la oficina al [phone] y hable con [place]ha [place].  Usama el seguimiento en mi oficina [address] [facility], [address] en 2 semanas, a menos que se indique lo

## 2018-11-14 NOTE — OP NOTES
Preoperative diagnosis:   Open right index middle phalanx fracture    Postoperative diagnosis:   Open right index middle phalanx fracture    Procedure:   1. Closed reduction and percutaneous pinning right index finger middle phalanx fracture   2. Irrigation and debridement right index middle phalanx fracture    Surgeon:  Mary Cherry MD    Anesthesia:   local    EBL:  minimal    Specimens:  none    Complications:   none    Implants:  0.045 K-wire     Assistant: None    Indications for the procedure:   49-year-old male who suffered a significant injury to the right index finger last week. He caught his finger in a belt of a car. He has a displaced fracture of the middle phalanx. Indicated for operative management. He  Also suffered a laceration over the fracture. We have discussed the reasonable expectations and usual postoperative course. The risks, benefits, alternatives and potential complications of surgery were discussed with the patient and   He has elected to proceed. Risks discussed included  Malunion, nonunion, infection, bleeding, nerve injury, scar, chronic pain, and need for further surgery. Description of the procedure: The patient was identified in the preoperative holding area. The operative site was marked. Informed consent was obtained. A digital block was performed with 0.5% Marcaine and 1% lidocaine. The patient was transported to the OR and placed supine on the operating table. All bony prominences were well-padded. A tourniquet was applied to the upper brachium. Sutures removed. The right upper extremity was sterilely prepped and draped in usual fashion. Surgical time-out was held. The operative site was confirmed. Preop antibiotics were not used. The wound was explored. He had an oblique wound on the dorsal and ulnar aspect of the digit. The extensor mechanism was found to be intact. The  Ulnar neurovascular bundle was found to be intact.   The wound was thoroughly irrigated. Single 0.045 K-wire was then placed retrograde through the distal phalanx, across the DIP joint, and across the fracture. This provided excellent stability of the fracture. Acceptable reduction of the fracture and alignment of the hardware was confirmed via fluoroscopic imaging. The wound was loosely closed with multiple interrupted 4-0 nylon sutures. Sterile dressings were applied. A digital splint was applied. He was transferred to the PACU in stable condition without complication.

## 2018-11-14 NOTE — BRIEF OP NOTE
BRIEF OPERATIVE NOTE    Date of Procedure: 11/14/2018   Preoperative Diagnosis: OPEN DISPLACED FRACTURE OF MIDDLE PHALANX OF RIGHT INDEX FINGER  Postoperative Diagnosis: OPEN DISPLACED FRACTURE OF MIDDLE PHALANX OF     Procedure(s):  CLOSED REDUCTION PERCUTANEOUS PINNING RIGHT INDEX FINGER  Surgeon(s) and Role:     * Bernadette Upton MD - Primary         Surgical Assistant: none    Surgical Staff:  Circ-1: Dianelys Lake RN  Local Nurse Monitor: Yumiko Denson RN  Scrub RN-1: Obdulia Joaquin RN  Event Time In Time Out   Incision Start 1009    Incision Close 1019      Anesthesia: Local   Estimated Blood Loss: min  Specimens: * No specimens in log *   Findings: displaced P2 frx   Complications: none  Implants:   Implant Name Type Inv.  Item Serial No.  Lot No. LRB No. Used Action   [employer] C-WIRE .045&quot; REF [id number] Wire  NA [employer] RUBI [id number] Right 1 Implanted

## 2018-11-14 NOTE — ROUTINE PROCESS
Patient: Christine Kebede MRN: [id number]  SSN: [id number]   YOB: 1985  Age: 26 y.o. Sex: male     Patient is status post Procedure(s):  CLOSED REDUCTION PERCUTANEOUS PINNING RIGHT INDEX FINGER. Surgeon(s) and Role:     * George Maria MD - Primary    Local/Dose/Irrigation:  SEE MAR                                         Dressing/Packing:  Wound Hand Right-DRESSING TYPE: 4 x 4;Elastic bandage; Xeroform (11/14/18 0900)  Splint/Cast: Wound Hand Right-SPLINT TYPE/MATERIAL: Other(Comment)(FINGER SPLINT)]    Other:

## 2018-11-14 NOTE — H&P
CARE TEAM:  Patient Care Team:  No Pcp as PCP - General (General Practice)     ASSESSMENT:  1. Open displaced fracture of middle phalanx of right index finger, initial encounter          There is no problem list on file for this patient.        PLAN:  Treatment Plan:  Keflex as prescribed. Digital splint. We discussed risks and benefits of operative and non operative treatment. He has elected for operative management. Pinning of his fracture. We discussed this at length. He has given informed consent to proceed         Orders Placed This Encounter    BP Elevated Patient Education & Instructions      Follow-up: Return for first postoperative visit.      HISTORY OF PRESENT ILLNESS:  Chief Complaint: Injury of the Right Index Finger     Age: 26 y.o. Sex: male   History of present illness: Drew dietz pleasant 26 y.o. male who presents today for evaluation of right index finger injury. Injury was yesterday. He was fixing his car. He had a car running. He was attempting to oil a belt. His finger got caught in the belt. He had immediate onset of pain, deformity, laceration. Seen in the [facility]. He had his wound irrigated and sutured closed. He has been in a digital splint. Prescribed Keflex which he has not filled. He was told to follow-up with Dr. Nancy Martinez but showed up in my office today. Denies numbness.     Past medical history, past surgical history, medications, allergies, social history, and review of systems have been reviewed by me. No current facility-administered medications on file prior to encounter. Current Outpatient Medications on File Prior to Encounter   Medication Sig Dispense Refill    HYDROcodone-acetaminophen (NORCO) 5-325 mg per tablet Take 1 Tab by mouth every four (4) hours as needed for Pain. Max Daily Amount: 6 Tabs. 8 Tab 0    cephALEXin (KEFLEX) 500 mg capsule Take 1 Cap by mouth three (3) times daily for 7 days.  Print directions in Yoruba 21 Cap 0    loratadine (CLARITIN) 10 mg tablet Take 1 Tab by mouth daily. [place] 1 pastilla cada francisco 30 Tab 5    hydrocortisone 0.5 % lotion Use sparingly bid as needed 57 Bottle 0    guaiFENesin ER (MUCINEX) 600 mg ER tablet Take 1 Tab by mouth two (2) times a day. [place] 1 [employer] veces al francisco con un vaso de agua 20 Tab 1       Past Medical History:   Diagnosis Date    Fracture     right index finger       Social History     Socioeconomic History    Marital status:      Spouse name: Not on file    Number of children: Not on file    Years of education: Not on file    Highest education level: Not on file   Social Needs    Financial resource strain: Not on file    Food insecurity - worry: Not on file    Food insecurity - inability: Not on file   [employer] needs - medical: Not on file   [email or web address] needs - non-medical: Not on file   Occupational History    Not on file   Tobacco Use    Smoking status: Never Smoker    Smokeless tobacco: Never Used   Substance and Sexual Activity    Alcohol use: No    Drug use: Not on file    Sexual activity: Not on file   Other Topics Concern    Not on file   Social History Narrative    Not on file       No Known Allergies       Review of Systems   11/8/2018     Constitutional: Unexplained: Negative  Genitourinary: Frequent Urination: Negative  HEENT: Vision Loss: Negative  Neurological: Memory Loss: Negative  Integumentary: Rash: Negative  Cardiovascular: Palpatations: Negative  Hematologic: Bruises/Bleeds Easily: Negative  Gastrointestinal: Constipation: Negative  Immunological: Seasonal Allergies: Negative  Musculoskeletal: Joint Pain: Positive        OBJECTIVE:  Constitutional:  No acute distress. Pleasant and cooperative with exam.  HEENT: Normocephalic. Atraumatic. Eyes are clear  Hearing intact to spoken word   Respiratory:  Breathing unlabored. .  Cardiovascular:  No marked edema. Psychiatric: Alert.  Affect appropriate.   Gait: Normal.  Musculoskeletal    He has a complex laceration on the dorsal aspect of the digit overlying the middle phalanx. He has brisk cap refill at the tip of the digit. He has intact sensibility at the tip of the digit. He has intact FDP function. Intact extensor function. He has obvious deformity of the digit. Extension deformity through the middle phalanx.     IMAGING / STUDIES:           No imaging obtained       We reviewed his x-ray images dated yesterday at [facility]. He has a comminuted fracture of the middle phalanx.   Significant deformity on the lateral.

## 2020-06-02 ENCOUNTER — HOSPITAL ENCOUNTER (EMERGENCY)
Age: 35
Discharge: LEFT BEFORE DISCHARGE | End: 2020-06-02
Payer: SUBSIDIZED

## 2020-06-02 PROCEDURE — 75810000275 HC EMERGENCY DEPT VISIT NO LEVEL OF CARE

## 2020-06-11 ENCOUNTER — HOSPITAL ENCOUNTER (OUTPATIENT)
Dept: GENERAL RADIOLOGY | Age: 35
Discharge: HOME | End: 2020-06-11
Payer: SUBSIDIZED

## 2020-06-11 DIAGNOSIS — R05.9 COUGH: ICD-10-CM

## 2020-06-11 PROCEDURE — 71046 X-RAY EXAM CHEST 2 VIEWS: CPT

## 2023-05-18 RX ORDER — GUAIFENESIN 600 MG/1
600 TABLET, EXTENDED RELEASE ORAL 2 TIMES DAILY
COMMUNITY
Start: 2018-03-08

## 2023-05-18 RX ORDER — LORATADINE 10 MG/1
10 TABLET ORAL DAILY
COMMUNITY
Start: 2018-03-08

## 2023-05-18 RX ORDER — HYDROCODONE BITARTRATE AND ACETAMINOPHEN 5; 325 MG/1; MG/1
1 TABLET ORAL EVERY 4 HOURS PRN
COMMUNITY
Start: 2018-11-14

## (undated) DEVICE — Device

## (undated) DEVICE — Z DISCONTINUED GLOVE SURG SZ 7.5 L12IN FNGR THK13MIL WHT ISOLEX

## (undated) DEVICE — DRAPE,HAND,STERILE: Brand: MEDLINE

## (undated) DEVICE — NEEDLE HYPO 25GA L1.5IN BVL ORIENTED ECLIPSE

## (undated) DEVICE — CAP PROTCT PIN 0.045INX0.89MM --

## (undated) DEVICE — SYR 10ML LUER LOK 1/5ML GRAD --

## (undated) DEVICE — DRAPE CARM MINI FOR IMAG SYS INSIGHT FLROSCN

## (undated) DEVICE — GAUZE SPONGES,12 PLY: Brand: CURITY